# Patient Record
Sex: FEMALE | Race: WHITE
[De-identification: names, ages, dates, MRNs, and addresses within clinical notes are randomized per-mention and may not be internally consistent; named-entity substitution may affect disease eponyms.]

---

## 2018-02-06 ENCOUNTER — HOSPITAL ENCOUNTER (OUTPATIENT)
Dept: HOSPITAL 92 - DTY/OP | Age: 28
Discharge: HOME | End: 2018-02-06
Attending: SPECIALIST
Payer: COMMERCIAL

## 2018-02-06 DIAGNOSIS — Z01.818: Primary | ICD-10-CM

## 2018-02-06 DIAGNOSIS — E66.01: ICD-10-CM

## 2018-02-06 PROCEDURE — 97802 MEDICAL NUTRITION INDIV IN: CPT

## 2018-04-13 ENCOUNTER — HOSPITAL ENCOUNTER (INPATIENT)
Dept: HOSPITAL 92 - SURG A | Age: 28
LOS: 7 days | Discharge: HOME | DRG: 621 | End: 2018-04-20
Attending: SPECIALIST | Admitting: SPECIALIST
Payer: MEDICARE

## 2018-04-13 ENCOUNTER — HOSPITAL ENCOUNTER (OUTPATIENT)
Dept: HOSPITAL 92 - LABBT | Age: 28
Discharge: HOME | End: 2018-04-13
Attending: SPECIALIST
Payer: MEDICARE

## 2018-04-13 VITALS — BODY MASS INDEX: 38.5 KG/M2

## 2018-04-13 DIAGNOSIS — K21.9: ICD-10-CM

## 2018-04-13 DIAGNOSIS — Z87.820: ICD-10-CM

## 2018-04-13 DIAGNOSIS — E66.01: ICD-10-CM

## 2018-04-13 DIAGNOSIS — E66.01: Primary | ICD-10-CM

## 2018-04-13 DIAGNOSIS — Z79.84: ICD-10-CM

## 2018-04-13 DIAGNOSIS — Z01.818: Primary | ICD-10-CM

## 2018-04-13 DIAGNOSIS — E11.9: ICD-10-CM

## 2018-04-13 LAB
PREGS CONTROL BACKGROUND?: (no result)
PREGS CONTROL BAR APPEAR?: YES

## 2018-04-13 PROCEDURE — 80048 BASIC METABOLIC PNL TOTAL CA: CPT

## 2018-04-13 PROCEDURE — 85025 COMPLETE CBC W/AUTO DIFF WBC: CPT

## 2018-04-13 PROCEDURE — A4216 STERILE WATER/SALINE, 10 ML: HCPCS

## 2018-04-13 PROCEDURE — 84703 CHORIONIC GONADOTROPIN ASSAY: CPT

## 2018-04-13 PROCEDURE — C9113 INJ PANTOPRAZOLE SODIUM, VIA: HCPCS

## 2018-04-13 PROCEDURE — 36415 COLL VENOUS BLD VENIPUNCTURE: CPT

## 2018-04-19 PROCEDURE — 0D164ZL BYPASS STOMACH TO TRANSVERSE COLON, PERCUTANEOUS ENDOSCOPIC APPROACH: ICD-10-PCS | Performed by: SPECIALIST

## 2018-04-19 RX ADMIN — POTASSIUM CHLORIDE, DEXTROSE MONOHYDRATE AND SODIUM CHLORIDE SCH MLS: 150; 5; 450 INJECTION, SOLUTION INTRAVENOUS at 17:56

## 2018-04-19 RX ADMIN — POTASSIUM CHLORIDE, DEXTROSE MONOHYDRATE AND SODIUM CHLORIDE SCH: 150; 5; 450 INJECTION, SOLUTION INTRAVENOUS at 14:33

## 2018-04-20 VITALS — DIASTOLIC BLOOD PRESSURE: 66 MMHG | TEMPERATURE: 98.2 F | SYSTOLIC BLOOD PRESSURE: 99 MMHG

## 2018-04-20 LAB
ANION GAP SERPL CALC-SCNC: 7 MMOL/L (ref 10–20)
BASOPHILS # BLD AUTO: 0 THOU/UL (ref 0–0.2)
BASOPHILS NFR BLD AUTO: 0.3 % (ref 0–1)
BUN SERPL-MCNC: 5 MG/DL (ref 7–18.7)
CALCIUM SERPL-MCNC: 8.5 MG/DL (ref 7.8–10.44)
CHLORIDE SERPL-SCNC: 106 MMOL/L (ref 98–107)
CO2 SERPL-SCNC: 28 MMOL/L (ref 22–29)
CREAT CL PREDICTED SERPL C-G-VRATE: 186 ML/MIN (ref 70–130)
EOSINOPHIL # BLD AUTO: 0 THOU/UL (ref 0–0.7)
EOSINOPHIL NFR BLD AUTO: 0.2 % (ref 0–10)
GLUCOSE SERPL-MCNC: 129 MG/DL (ref 70–105)
HGB BLD-MCNC: 11.1 G/DL (ref 12–16)
LYMPHOCYTES # BLD: 1.5 THOU/UL (ref 1.2–3.4)
LYMPHOCYTES NFR BLD AUTO: 16 % (ref 21–51)
MCH RBC QN AUTO: 30.6 PG (ref 27–31)
MCV RBC AUTO: 91 FL (ref 81–99)
MONOCYTES # BLD AUTO: 0.7 THOU/UL (ref 0.11–0.59)
MONOCYTES NFR BLD AUTO: 7.5 % (ref 0–10)
NEUTROPHILS # BLD AUTO: 7.3 THOU/UL (ref 1.4–6.5)
NEUTROPHILS NFR BLD AUTO: 75.9 % (ref 42–75)
PLATELET # BLD AUTO: 218 THOU/UL (ref 130–400)
POTASSIUM SERPL-SCNC: 4.1 MMOL/L (ref 3.5–5.1)
RBC # BLD AUTO: 3.64 MILL/UL (ref 4.2–5.4)
SODIUM SERPL-SCNC: 137 MMOL/L (ref 136–145)
WBC # BLD AUTO: 9.6 THOU/UL (ref 4.8–10.8)

## 2018-04-20 RX ADMIN — POTASSIUM CHLORIDE, DEXTROSE MONOHYDRATE AND SODIUM CHLORIDE SCH MLS: 150; 5; 450 INJECTION, SOLUTION INTRAVENOUS at 03:00

## 2018-04-23 ENCOUNTER — HOSPITAL ENCOUNTER (INPATIENT)
Dept: HOSPITAL 92 - ERS | Age: 28
LOS: 1 days | Discharge: HOME | DRG: 389 | End: 2018-04-24
Attending: SPECIALIST | Admitting: SPECIALIST
Payer: MEDICARE

## 2018-04-23 VITALS — BODY MASS INDEX: 37.5 KG/M2

## 2018-04-23 DIAGNOSIS — Z87.820: ICD-10-CM

## 2018-04-23 DIAGNOSIS — Z98.84: ICD-10-CM

## 2018-04-23 DIAGNOSIS — K56.7: Primary | ICD-10-CM

## 2018-04-23 DIAGNOSIS — T40.605A: ICD-10-CM

## 2018-04-23 DIAGNOSIS — R50.9: ICD-10-CM

## 2018-04-23 DIAGNOSIS — Z88.2: ICD-10-CM

## 2018-04-23 DIAGNOSIS — J98.11: ICD-10-CM

## 2018-04-23 DIAGNOSIS — K21.9: ICD-10-CM

## 2018-04-23 DIAGNOSIS — E66.9: ICD-10-CM

## 2018-04-23 LAB
ALBUMIN SERPL BCG-MCNC: 4.1 G/DL (ref 3.5–5)
ALP SERPL-CCNC: 51 U/L (ref 40–150)
ALT SERPL W P-5'-P-CCNC: 82 U/L (ref 8–55)
ANION GAP SERPL CALC-SCNC: 15 MMOL/L (ref 10–20)
AST SERPL-CCNC: 23 U/L (ref 5–34)
BASOPHILS # BLD AUTO: 0 THOU/UL (ref 0–0.2)
BASOPHILS NFR BLD AUTO: 0.1 % (ref 0–1)
BILIRUB SERPL-MCNC: 0.6 MG/DL (ref 0.2–1.2)
BUN SERPL-MCNC: 10 MG/DL (ref 7–18.7)
CALCIUM SERPL-MCNC: 9.4 MG/DL (ref 7.8–10.44)
CHLORIDE SERPL-SCNC: 101 MMOL/L (ref 98–107)
CO2 SERPL-SCNC: 23 MMOL/L (ref 22–29)
CREAT CL PREDICTED SERPL C-G-VRATE: 0 ML/MIN (ref 70–130)
EOSINOPHIL # BLD AUTO: 0 THOU/UL (ref 0–0.7)
EOSINOPHIL NFR BLD AUTO: 0.2 % (ref 0–10)
GLOBULIN SER CALC-MCNC: 3.3 G/DL (ref 2.4–3.5)
GLUCOSE SERPL-MCNC: 114 MG/DL (ref 70–105)
HGB BLD-MCNC: 12.5 G/DL (ref 12–16)
LIPASE SERPL-CCNC: 66 U/L (ref 8–78)
LYMPHOCYTES # BLD: 0.7 THOU/UL (ref 1.2–3.4)
LYMPHOCYTES NFR BLD AUTO: 7.5 % (ref 21–51)
MCH RBC QN AUTO: 31.4 PG (ref 27–31)
MCV RBC AUTO: 88.6 FL (ref 81–99)
MONOCYTES # BLD AUTO: 0.3 THOU/UL (ref 0.11–0.59)
MONOCYTES NFR BLD AUTO: 3.8 % (ref 0–10)
NEUTROPHILS # BLD AUTO: 8 THOU/UL (ref 1.4–6.5)
NEUTROPHILS NFR BLD AUTO: 88.5 % (ref 42–75)
PLATELET # BLD AUTO: 299 THOU/UL (ref 130–400)
POTASSIUM SERPL-SCNC: 3.3 MMOL/L (ref 3.5–5.1)
PREGU CONTROL BACKGROUND?: (no result)
PREGU CONTROL BAR APPEAR?: YES
RBC # BLD AUTO: 3.97 MILL/UL (ref 4.2–5.4)
SODIUM SERPL-SCNC: 136 MMOL/L (ref 136–145)
SP GR UR STRIP: 1.02 (ref 1–1.04)
WBC # BLD AUTO: 9 THOU/UL (ref 4.8–10.8)

## 2018-04-23 PROCEDURE — 93005 ELECTROCARDIOGRAM TRACING: CPT

## 2018-04-23 PROCEDURE — 83690 ASSAY OF LIPASE: CPT

## 2018-04-23 PROCEDURE — 80048 BASIC METABOLIC PNL TOTAL CA: CPT

## 2018-04-23 PROCEDURE — 81025 URINE PREGNANCY TEST: CPT

## 2018-04-23 PROCEDURE — 96361 HYDRATE IV INFUSION ADD-ON: CPT

## 2018-04-23 PROCEDURE — 74177 CT ABD & PELVIS W/CONTRAST: CPT

## 2018-04-23 PROCEDURE — 80053 COMPREHEN METABOLIC PANEL: CPT

## 2018-04-23 PROCEDURE — 96367 TX/PROPH/DG ADDL SEQ IV INF: CPT

## 2018-04-23 PROCEDURE — 87040 BLOOD CULTURE FOR BACTERIA: CPT

## 2018-04-23 PROCEDURE — 71046 X-RAY EXAM CHEST 2 VIEWS: CPT

## 2018-04-23 PROCEDURE — 81003 URINALYSIS AUTO W/O SCOPE: CPT

## 2018-04-23 PROCEDURE — 87086 URINE CULTURE/COLONY COUNT: CPT

## 2018-04-23 PROCEDURE — 96365 THER/PROPH/DIAG IV INF INIT: CPT

## 2018-04-23 PROCEDURE — 85025 COMPLETE CBC W/AUTO DIFF WBC: CPT

## 2018-04-23 PROCEDURE — 36415 COLL VENOUS BLD VENIPUNCTURE: CPT

## 2018-04-23 PROCEDURE — 83605 ASSAY OF LACTIC ACID: CPT

## 2018-04-23 RX ADMIN — POTASSIUM CHLORIDE, DEXTROSE MONOHYDRATE AND SODIUM CHLORIDE SCH MLS: 150; 5; 450 INJECTION, SOLUTION INTRAVENOUS at 16:04

## 2018-04-23 RX ADMIN — SODIUM PHOSPHATE SCH ML: 7; 19 ENEMA RECTAL at 21:58

## 2018-04-23 RX ADMIN — SODIUM PHOSPHATE SCH ML: 7; 19 ENEMA RECTAL at 17:03

## 2018-04-23 NOTE — CT
CT ABDOMEN AND PELVIS WITH IV CONTRAST:

 

DATE: 4/23/18.

 

HISTORY: 

Postoperative fever secondary to bariatric surgery.

 

COMPARISON: 

None available.

 

FINDINGS: 

There are postsurgical changes related to gastric bypass procedure.  There is no extravasation of con
trast seen at the gastroenteric anastomosis.  No free intraperitoneal gas is visualized.  No fluid co
llection is seen to suggest an abscess.

 

There are patchy airspace opacities at each lung base which may be related to atelectasis, although t
here is a question of additional reticulonodular densities at the right lung base, and findings could
 be related to pneumonitis at each lung base.

 

There is a tiny left pleural effusion present.

 

While precontrast images were not obtained, there does appear to be mild diminished attenuation of th
e liver relative to the spleen which is suggestive of fatty infiltration.  The liver is otherwise nor
mal in appearance.

 

The spleen, pancreas, bilateral adrenal glands, kidneys, urinary bladder, uterus, and adnexal structu
res have a normal CT appearance.

 

There is a small amount of free fluid in the pelvis.  

 

The opacified proximal small bowel is normal in appearance.  The remainder of the small bowel is also
 normal in caliber.

 

The appendix is visualized and normal in caliber.

 

Abdominal aorta is normal in appearance.

 

Osseous structures are intact.

 

IMPRESSION: 

1.  Tiny left pleural effusion with bibasilar ill-defined airspace opacities which may be attributabl
e to atelectasis, but pneumonitis is a possibility.

 

2.  Postsurgical changes related to gastric bypass procedure.  There is no extravasation of contrast,
 and no free intraperitoneal gas or fluid collection is seen adjacent to the region of postsurgical c
hange.

 

3.  No evidence of a bowel obstruction.

 

4.  Small amount of free fluid in the pelvis including the right paracolic gutter.

 

5.  Mild fatty infiltration of the liver.

 

6.  No CT evidence of appendicitis.

 

 

 

POS: [facility]

## 2018-04-23 NOTE — RAD
PA AND LATERAL VIEWS CHEST:

 

Date:  04/23/18 

 

HISTORY:  

Cough and fever. Patient had bypass on 04/19/18. 

 

FINDINGS:

The heart size is normal. There is a mild infiltrate in the left inferior lower lobe. No pneumothorac
es are seen. A tiny left pleural effusion may be present. 

 

IMPRESSION: 

Possibility of left lower lobe pneumonia should be considered. 

 

 

POS: [facility]

## 2018-04-23 NOTE — HP
CHIEF COMPLAINT:  Fever, tachycardia.

 

HISTORY OF PRESENT ILLNESS:  The patient is a 28-year-old white female.  She is status post uneventfu
l laparoscopic gastric bypass on 2018.  She is now postoperative day #4.  She was discharged ho
me the day after surgery, tolerating liquids and entirely stable.  She notes that she developed a fev
er this morning and called my office.  Her temperature at home when measured was 103.6.  Her temperat
ure when checked here in the emergency room was 102.2.  She was tachycardic with heart rate in the 14
0s.  Labs and cultures were drawn and she was then given IV fluids and IV antibiotics using Zosyn.

 

She notes that she has been tolerating her liquids and has not vomited.  She has been urinating frequ
ently, but has passed no flatus or bowel movement.  She does note that she has been using a large ramila
unt of her narcotic medication.  She has significant perceived pain in her upper abdomen.  She notes 
that she is able to breathe and cough, but she has pain with coughing.  She has no sensation of short
ness of breath.

 

PAST MEDICAL HISTORY:  Significant for traumatic brain injury, gastroesophageal reflux disease, and o
besity.

 

PAST SURGICAL HISTORY:  , tracheostomy, PEG tube placement, laparoscopic gastric bypass on 0
2018.

 

CURRENT MEDICATIONS:  When she was discharged include pantoprazole, albuterol p.r.n., bariatric vitam
ins, hydrocodone elixir.

 

ALLERGIES:  BACTRIM.

 

PERSONAL AND SOCIAL HISTORY:  She is disabled secondary to history of traumatic brain injury.  She do
es not smoke nor does she drink alcohol.

 

PHYSICAL EXAMINATION:

GENERAL APPEARANCE:  On examination, she is alert and pleasant.  She does not appear to be in severe 
distress.  She notes that she feels bloated and has generalized discomfort within her abdomen, but no
 acute pain.  She is conversant and cooperative.

VITAL SIGNS:  She is currently afebrile with temperature of 100, pulse is down to 120 and it is regul
ar, blood pressure is within normal limits at 120/65.

HEENT:  Unremarkable.

NECK:  Supple.

LUNGS:  Clear to auscultation anteriorly.

CARDIAC:  Regular rate and rhythm without murmur.

ABDOMEN:  Incisions are nicely healed without evidence of infection.  She has essentially no audible 
bowel sounds in her abdomen.  Her abdomen does appear to be somewhat distended.

RECTAL:  Exam is deferred.

 

LABORATORY DATA AND IMAGING DATA:  Her hemoglobin is 12.5.  This is up from 11.1 day after her surger
y.  Her white blood cell count is 9.  She does appear to have a left shift with 88% neutrophils.  Vidya
mamta profile reveals essentially normal electrolytes.  Her BUN and creatinine are normal.  Her pota
ssium is slightly low at 3.3.  Her albumin is normal at 4.1.  Liver function tests are normal.  Chest
 x-ray and CT scan of abdomen and pelvis were reviewed.  She has possibly some atelectasis at the bas
e of her left lung, both seen on chest x-ray and CT of the abdomen and pelvis.  There are no extralum
inal contrasts nor any significant volume of free fluid, nor any free air in her abdomen.  She does h
ave some distention and most of that appears to be in her colon.  Her small bowel appears to be fluid
 filled, but not distended.  There is no distention of the bypassed portion of the stomach.

 

ASSESSMENT:  Patient is febrile and tachycardic.  I suspect this is because of her pulmonary findings
 even though they are not very impressive on CT scan.  I suspect that she had some atelectasis and po
ssibly some early pneumonia.  I suspect that she has narcotic-induced ileus that which led to abdomin
al distention and that has led to decreased pulmonary function.  [facility].  She will be encouraged to ambulate.  Perform incentive spirometry
 and cough.  I will start her empirically on MiraLax and enemas and to try to minimize her narcotics.
  She will be given Ofirmev and Toradol for pain control.  I would anticipate hospital stay of a coup
le of days to ensure that she may safely be discharged.

## 2018-04-24 VITALS — TEMPERATURE: 97.9 F | SYSTOLIC BLOOD PRESSURE: 98 MMHG | DIASTOLIC BLOOD PRESSURE: 62 MMHG

## 2018-04-24 LAB
ANION GAP SERPL CALC-SCNC: 10 MMOL/L (ref 10–20)
BASOPHILS # BLD AUTO: 0 THOU/UL (ref 0–0.2)
BASOPHILS NFR BLD AUTO: 0.3 % (ref 0–1)
BUN SERPL-MCNC: 7 MG/DL (ref 7–18.7)
CALCIUM SERPL-MCNC: 8.4 MG/DL (ref 7.8–10.44)
CHLORIDE SERPL-SCNC: 107 MMOL/L (ref 98–107)
CO2 SERPL-SCNC: 25 MMOL/L (ref 22–29)
CREAT CL PREDICTED SERPL C-G-VRATE: 201 ML/MIN (ref 70–130)
EOSINOPHIL # BLD AUTO: 0.1 THOU/UL (ref 0–0.7)
EOSINOPHIL NFR BLD AUTO: 2.1 % (ref 0–10)
GLUCOSE SERPL-MCNC: 100 MG/DL (ref 70–105)
HGB BLD-MCNC: 10.2 G/DL (ref 12–16)
LYMPHOCYTES # BLD: 1.4 THOU/UL (ref 1.2–3.4)
LYMPHOCYTES NFR BLD AUTO: 20.7 % (ref 21–51)
MCH RBC QN AUTO: 30.3 PG (ref 27–31)
MCV RBC AUTO: 89.8 FL (ref 81–99)
MONOCYTES # BLD AUTO: 0.4 THOU/UL (ref 0.11–0.59)
MONOCYTES NFR BLD AUTO: 6.6 % (ref 0–10)
NEUTROPHILS # BLD AUTO: 4.7 THOU/UL (ref 1.4–6.5)
NEUTROPHILS NFR BLD AUTO: 70.3 % (ref 42–75)
PLATELET # BLD AUTO: 245 THOU/UL (ref 130–400)
POTASSIUM SERPL-SCNC: 3.3 MMOL/L (ref 3.5–5.1)
RBC # BLD AUTO: 3.38 MILL/UL (ref 4.2–5.4)
SODIUM SERPL-SCNC: 139 MMOL/L (ref 136–145)
WBC # BLD AUTO: 6.7 THOU/UL (ref 4.8–10.8)

## 2018-04-24 RX ADMIN — POTASSIUM CHLORIDE, DEXTROSE MONOHYDRATE AND SODIUM CHLORIDE SCH MLS: 150; 5; 450 INJECTION, SOLUTION INTRAVENOUS at 05:06

## 2018-04-24 NOTE — DPRG
DATE OF SERVICE:  04/24/2018

 

HISTORY:  This is hospital day #2.  Ms. Abdalla was admitted yesterday with a high fever and tachyca
rdia.  CT scan showed no evidence of intra-abdominal problem or leak.  I suspected a pulmonary etiolo
gy.  She was admitted to the hospital and narcotics were avoided.  She was given an enema and MiraLax
 and she had resumption of bowel function.  She has had no nausea or vomiting since admission.  She h
as been afebrile since admission.

 

She notes today that she has zero pain.  She has been ambulating and tolerating her clear liquid diet
 uneventfully.

 

PHYSICAL  EXAMINATION:  

LUNGS:  Her lungs are clear to auscultation.  She has a good cough.

ABDOMEN:  Soft and nontender.  Incisions are nicely healed.  She has good bowel sounds.

 

LABORATORY STUDIES:  Her metabolic panel and CBC are essentially normal with normal white count, no e
vidence of left shift.

 

ASSESSMENT AND PLAN:  The patient with resolved fever and tachycardia.  I suspect that this was relat
ed to atelectasis.  It was associated with bowel distention secondary to some degree of ileus related
 to narcotic use.  All these problems seemed to have resolved and she feels well today.  I believe padmini marrero is stable for discharge home on her liquid diet.  She is instructed to avoid narcotics.  I will see
 her next week for her usual followup visit 2 weeks out from surgery.

## 2019-02-16 ENCOUNTER — HOSPITAL ENCOUNTER (EMERGENCY)
Dept: HOSPITAL 92 - ERS | Age: 29
Discharge: HOME | End: 2019-02-16
Payer: MEDICARE

## 2019-02-16 DIAGNOSIS — N30.00: Primary | ICD-10-CM

## 2019-02-16 LAB
ALBUMIN SERPL BCG-MCNC: 4.4 G/DL (ref 3.5–5)
ALP SERPL-CCNC: 53 U/L (ref 40–150)
ALT SERPL W P-5'-P-CCNC: 33 U/L (ref 8–55)
ANION GAP SERPL CALC-SCNC: 12 MMOL/L (ref 10–20)
AST SERPL-CCNC: 24 U/L (ref 5–34)
BASOPHILS # BLD AUTO: 0.1 THOU/UL (ref 0–0.2)
BASOPHILS NFR BLD AUTO: 1.5 % (ref 0–1)
BILIRUB SERPL-MCNC: 0.3 MG/DL (ref 0.2–1.2)
BUN SERPL-MCNC: 11 MG/DL (ref 7–18.7)
CALCIUM SERPL-MCNC: 9.6 MG/DL (ref 7.8–10.44)
CHLORIDE SERPL-SCNC: 103 MMOL/L (ref 98–107)
CO2 SERPL-SCNC: 28 MMOL/L (ref 22–29)
CREAT CL PREDICTED SERPL C-G-VRATE: 0 ML/MIN (ref 70–130)
CRYSTAL-AUWI FLAG: 1.1 (ref 0–15)
EOSINOPHIL # BLD AUTO: 0.1 THOU/UL (ref 0–0.7)
EOSINOPHIL NFR BLD AUTO: 1.5 % (ref 0–10)
GLOBULIN SER CALC-MCNC: 3 G/DL (ref 2.4–3.5)
GLUCOSE SERPL-MCNC: 118 MG/DL (ref 70–105)
HEV IGM SER QL: 0.5 (ref 0–7.99)
HGB BLD-MCNC: 12.9 G/DL (ref 12–16)
HYALINE CASTS #/AREA URNS LPF: (no result) LPF
LYMPHOCYTES # BLD: 2.9 THOU/UL (ref 1.2–3.4)
LYMPHOCYTES NFR BLD AUTO: 38.4 % (ref 21–51)
MCH RBC QN AUTO: 31.5 PG (ref 27–31)
MCV RBC AUTO: 93.3 FL (ref 78–98)
MONOCYTES # BLD AUTO: 0.3 THOU/UL (ref 0.11–0.59)
MONOCYTES NFR BLD AUTO: 4.5 % (ref 0–10)
NEUTROPHILS # BLD AUTO: 4.1 THOU/UL (ref 1.4–6.5)
NEUTROPHILS NFR BLD AUTO: 54.1 % (ref 42–75)
PATHC CAST-AUWI FLAG: 0 (ref 0–2.49)
PLATELET # BLD AUTO: 257 THOU/UL (ref 130–400)
POTASSIUM SERPL-SCNC: 3.8 MMOL/L (ref 3.5–5.1)
PREGU CONTROL BACKGROUND?: (no result)
PREGU CONTROL BAR APPEAR?: YES
RBC # BLD AUTO: 4.09 MILL/UL (ref 4.2–5.4)
SODIUM SERPL-SCNC: 139 MMOL/L (ref 136–145)
SP GR UR STRIP: 1.02 (ref 1–1.04)
SPERM-AUWI FLAG: 0 (ref 0–9.9)
WBC # BLD AUTO: 7.5 THOU/UL (ref 4.8–10.8)
YEAST-AUWI FLAG: 0 (ref 0–25)

## 2019-02-16 PROCEDURE — 85025 COMPLETE CBC W/AUTO DIFF WBC: CPT

## 2019-02-16 PROCEDURE — 83690 ASSAY OF LIPASE: CPT

## 2019-02-16 PROCEDURE — 81015 MICROSCOPIC EXAM OF URINE: CPT

## 2019-02-16 PROCEDURE — 99284 EMERGENCY DEPT VISIT MOD MDM: CPT

## 2019-02-16 PROCEDURE — 80053 COMPREHEN METABOLIC PANEL: CPT

## 2019-02-16 PROCEDURE — 81025 URINE PREGNANCY TEST: CPT

## 2019-02-16 PROCEDURE — 36415 COLL VENOUS BLD VENIPUNCTURE: CPT

## 2019-02-16 PROCEDURE — 81003 URINALYSIS AUTO W/O SCOPE: CPT

## 2019-06-07 ENCOUNTER — HOSPITAL ENCOUNTER (EMERGENCY)
Dept: HOSPITAL 92 - ERS | Age: 29
Discharge: HOME | End: 2019-06-07
Payer: MEDICARE

## 2019-06-07 DIAGNOSIS — L29.9: ICD-10-CM

## 2019-06-07 DIAGNOSIS — O99.89: Primary | ICD-10-CM

## 2019-06-07 DIAGNOSIS — Z3A.19: ICD-10-CM

## 2019-06-07 LAB
CRYSTAL-AUWI FLAG: 0 (ref 0–15)
HCG UR QL: POSITIVE
HEV IGM SER QL: 0.6 (ref 0–7.99)
HYALINE CASTS #/AREA URNS LPF: (no result) LPF
PATHC CAST-AUWI FLAG: 0.13 (ref 0–2.49)
PREGU CONTROL BACKGROUND?: (no result)
PREGU CONTROL BAR APPEAR?: YES
RBC UR QL AUTO: (no result) HPF (ref 0–3)
SP GR UR STRIP: 1.01 (ref 1–1.04)
SPERM-AUWI FLAG: 0 (ref 0–9.9)
WBC UR QL AUTO: (no result) HPF (ref 0–3)
YEAST-AUWI FLAG: 0 (ref 0–25)

## 2019-06-07 PROCEDURE — 87510 GARDNER VAG DNA DIR PROBE: CPT

## 2019-06-07 PROCEDURE — 87480 CANDIDA DNA DIR PROBE: CPT

## 2019-06-07 PROCEDURE — 87591 N.GONORRHOEAE DNA AMP PROB: CPT

## 2019-06-07 PROCEDURE — 87491 CHLMYD TRACH DNA AMP PROBE: CPT

## 2019-06-07 PROCEDURE — 87660 TRICHOMONAS VAGIN DIR PROBE: CPT

## 2019-06-07 PROCEDURE — 81025 URINE PREGNANCY TEST: CPT

## 2019-06-07 PROCEDURE — 81015 MICROSCOPIC EXAM OF URINE: CPT

## 2019-06-07 PROCEDURE — 81003 URINALYSIS AUTO W/O SCOPE: CPT

## 2019-06-25 ENCOUNTER — HOSPITAL ENCOUNTER (OUTPATIENT)
Dept: HOSPITAL 92 - L&D/OP | Age: 29
Discharge: HOME | End: 2019-06-25
Attending: OBSTETRICS & GYNECOLOGY
Payer: MEDICARE

## 2019-06-25 VITALS — BODY MASS INDEX: 26.3 KG/M2

## 2019-06-25 DIAGNOSIS — Z98.84: ICD-10-CM

## 2019-06-25 DIAGNOSIS — Z88.1: ICD-10-CM

## 2019-06-25 DIAGNOSIS — Z3A.21: ICD-10-CM

## 2019-06-25 DIAGNOSIS — O36.8120: Primary | ICD-10-CM

## 2019-06-25 DIAGNOSIS — Z88.8: ICD-10-CM

## 2019-06-25 DIAGNOSIS — Z88.2: ICD-10-CM

## 2019-06-25 PROCEDURE — 99282 EMERGENCY DEPT VISIT SF MDM: CPT
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
## 2019-10-20 ENCOUNTER — HOSPITAL ENCOUNTER (OUTPATIENT)
Dept: HOSPITAL 92 - L&D/OP | Age: 29
Discharge: HOME | End: 2019-10-20
Attending: OBSTETRICS & GYNECOLOGY
Payer: MEDICARE

## 2019-10-20 VITALS — SYSTOLIC BLOOD PRESSURE: 108 MMHG | TEMPERATURE: 98.1 F | DIASTOLIC BLOOD PRESSURE: 62 MMHG

## 2019-10-20 VITALS — BODY MASS INDEX: 29 KG/M2

## 2019-10-20 DIAGNOSIS — Z88.2: ICD-10-CM

## 2019-10-20 DIAGNOSIS — Z88.8: ICD-10-CM

## 2019-10-20 DIAGNOSIS — O36.8130: Primary | ICD-10-CM

## 2019-10-20 DIAGNOSIS — J45.909: ICD-10-CM

## 2019-10-20 DIAGNOSIS — Z3A.38: ICD-10-CM

## 2019-10-20 DIAGNOSIS — O99.513: ICD-10-CM
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
## 2019-10-28 ENCOUNTER — HOSPITAL ENCOUNTER (INPATIENT)
Dept: HOSPITAL 92 - L&D | Age: 29
LOS: 2 days | Discharge: HOME | End: 2019-10-30
Attending: OBSTETRICS & GYNECOLOGY | Admitting: OBSTETRICS & GYNECOLOGY
Payer: MEDICARE

## 2019-10-28 VITALS — BODY MASS INDEX: 29.2 KG/M2

## 2019-10-28 DIAGNOSIS — F32.9: ICD-10-CM

## 2019-10-28 DIAGNOSIS — F41.9: ICD-10-CM

## 2019-10-28 DIAGNOSIS — Z3A.39: ICD-10-CM

## 2019-10-28 LAB
HBSAG INDEX: 0.14 S/CO (ref 0–0.99)
HGB BLD-MCNC: 10.6 G/DL (ref 12–16)
MCH RBC QN AUTO: 29.8 PG (ref 27–31)
MCV RBC AUTO: 89.9 FL (ref 78–98)
PLATELET # BLD AUTO: 270 THOU/UL (ref 130–400)
RBC # BLD AUTO: 3.56 MILL/UL (ref 4.2–5.4)
SYPHILIS ANTIBODY INDEX: 0.05 S/CO
WBC # BLD AUTO: 7.5 THOU/UL (ref 4.8–10.8)

## 2019-10-28 PROCEDURE — 85027 COMPLETE CBC AUTOMATED: CPT

## 2019-10-28 PROCEDURE — 86850 RBC ANTIBODY SCREEN: CPT

## 2019-10-28 PROCEDURE — 86900 BLOOD TYPING SEROLOGIC ABO: CPT

## 2019-10-28 PROCEDURE — 86901 BLOOD TYPING SEROLOGIC RH(D): CPT

## 2019-10-28 PROCEDURE — 36415 COLL VENOUS BLD VENIPUNCTURE: CPT

## 2019-10-28 PROCEDURE — 51702 INSERT TEMP BLADDER CATH: CPT

## 2019-10-28 PROCEDURE — 0HQ9XZZ REPAIR PERINEUM SKIN, EXTERNAL APPROACH: ICD-10-PCS | Performed by: OBSTETRICS & GYNECOLOGY

## 2019-10-28 PROCEDURE — 86780 TREPONEMA PALLIDUM: CPT

## 2019-10-28 PROCEDURE — 87340 HEPATITIS B SURFACE AG IA: CPT

## 2019-10-28 PROCEDURE — 10907ZC DRAINAGE OF AMNIOTIC FLUID, THERAPEUTIC FROM PRODUCTS OF CONCEPTION, VIA NATURAL OR ARTIFICIAL OPENING: ICD-10-PCS | Performed by: OBSTETRICS & GYNECOLOGY

## 2019-10-28 RX ADMIN — Medication SCH MLS: at 22:21

## 2019-10-29 RX ADMIN — HYDROCODONE BITARTRATE AND ACETAMINOPHEN PRN TAB: 5; 325 TABLET ORAL at 11:31

## 2019-10-29 RX ADMIN — HYDROCODONE BITARTRATE AND ACETAMINOPHEN PRN TAB: 5; 325 TABLET ORAL at 22:46

## 2019-10-29 RX ADMIN — DOCUSATE CALCIUM SCH MG: 240 CAPSULE, LIQUID FILLED ORAL at 09:13

## 2019-10-29 RX ADMIN — HYDROCODONE BITARTRATE AND ACETAMINOPHEN PRN TAB: 5; 325 TABLET ORAL at 13:21

## 2019-10-29 RX ADMIN — DOCUSATE CALCIUM SCH MG: 240 CAPSULE, LIQUID FILLED ORAL at 21:35

## 2019-10-29 RX ADMIN — Medication SCH MLS: at 02:05

## 2019-10-29 RX ADMIN — HYDROCODONE BITARTRATE AND ACETAMINOPHEN PRN TAB: 5; 325 TABLET ORAL at 03:12

## 2019-10-30 VITALS — DIASTOLIC BLOOD PRESSURE: 68 MMHG | SYSTOLIC BLOOD PRESSURE: 115 MMHG | TEMPERATURE: 98.8 F

## 2019-10-30 RX ADMIN — DOCUSATE CALCIUM SCH MG: 240 CAPSULE, LIQUID FILLED ORAL at 09:41

## 2019-10-30 RX ADMIN — HYDROCODONE BITARTRATE AND ACETAMINOPHEN PRN TAB: 5; 325 TABLET ORAL at 13:19

## 2019-10-30 NOTE — DN
DATE OF PROCEDURE:  10/28/2019



PREOPERATIVE DIAGNOSES:  

1. A 29-year-old G3, P2, with a history of a previous  section, in active

labor. 

2. Group B Streptococcus negative.

3. Artificial rupture of membranes for clear fluid.

4. History of gastric bypass with subsequent anemia of pregnancy due to

malabsorption. 

5. Depression and anxiety.



POSTOPERATIVE DIAGNOSES:  

1. A 29-year-old G3, P2, with a history of a previous  section, in active

labor. 

2. Group B Streptococcus negative.

3. Artificial rupture of membranes for clear fluid.

4. History of gastric bypass with subsequent anemia of pregnancy due to

malabsorption. 

5. Depression and anxiety.

6. Live-born male infant with Apgars of 9 and 9 at 1 and 5 minutes respectively.

7. Nuchal cord in delivery reduced.

8. Non-reassuring fetal heart tracing and asynclitic presentation at the perineum.



PROCEDURE PERFORMED:  Vacuum-assisted vaginal delivery.



QUANTITATIVE BLOOD LOSS:  100 mL.



CLINICAL HISTORY:  This patient is a 29-year-old  female, who presented to

her routine OB appointment and was noted to be in active labor.  She was 4 cm, and

80% effaced and feeling pressure.  She was sent to Labor and Delivery and placed on

the monitors and had a category 1 tracing.  An amniotomy was performed for clear

fluid.  Shortly thereafter, she did request an epidural for maternal analgesia and

was able to get adequate pain control.  She progressed appropriately on the labor

curve, and was complete and +1 station.  By the early afternoon, the patient was

able to push with good maternal effort; however the trajectory of the fetus

delivering in the birth canal was toward the pubic bone rather than down to the base

of the perineum.  The patient employed several methods to push and was successful in

getting the fetus under the pubic bone; however, the baby was starting to notice

some swelling and some caput on the head and the tracing was reflecting compression

of the cord as well as the infant not tolerating this trajectory.  The risks,

benefits, and possible complications as well as alternatives to vacuum-assisted

delivery were discussed and the patient wished to proceed. 



DESCRIPTION OF PROCEDURE:  The suction cup was placed over the presenting fetal

vertex after draining the bladder with a red rubber catheter.  Only 25 mL of clear

urine were obtained.  After the cup was placed on the head of the fetus, the borders

were swept to make sure that there was no other tissue maternal wise in the cup.

The suction was turned to the green zone and with the next contraction, the mother

was advised to push and the fetal vertex was pulled in a downward manner.  This

allowed for further delivery of the head.  With the second pull, the head delivered,

followed by the anterior shoulder, then the posterior shoulder, then the remainder

of the infant's body.  There was a nuchal cord that was noted and it was

successfully reduced at the perineum prior to delivery of the remainder of the body.

 The infant was stimulated and cried spontaneously.  The cord was doubly clamped and

cut.  There was some suction of the mouth and nose at the perineum.  The infant was

then placed onto the abdomen and then with continued stimulation and care there.

The patient was explored and noted to have only a left labial laceration, which was

not bleeding; however, was for cosmetic reasons, repaired, it was considered a first

degree.  The placenta was delivered spontaneously intact with a 3-vessel cord and

fundus massaged, noted as a firm uterus.  Exploration of the vagina, introitus, and

the cervix revealed nothing more afterwards.  The patient was allowed to recover on

Labor and Delivery in satisfactory condition with her infant.  Again, the infant was

a live born male, weighing 7 pounds 6 ounces with Apgars of 9 and 9 at 1 and 5

minutes respectively.  There were no other issues surrounding this delivery.  All

needle, sponge, lap, and instrument counts were correct x2 at the end of the

procedure. 







Job ID:  [id number]

## 2020-06-12 ENCOUNTER — HOSPITAL ENCOUNTER (OUTPATIENT)
Dept: HOSPITAL 92 - SCSULT | Age: 30
Discharge: HOME | End: 2020-06-12
Attending: FAMILY MEDICINE
Payer: MEDICARE

## 2020-06-12 DIAGNOSIS — E04.1: Primary | ICD-10-CM

## 2020-06-12 PROCEDURE — 76536 US EXAM OF HEAD AND NECK: CPT

## 2020-06-12 NOTE — ULT
THYROID ULTRASOUND:

 

Comparison: 10-27-17

 

Technique: Multiplanar grayscale and color doppler images were obtained in a thyroid ultrasound. 

 

FINDINGS: 

There is a solid nodule again seen in the right lobe of the thyroid. This is the nodule for which chiki
or biopsy was performed. This nodule was circumscribed and isoechoic to the back on thyroid parenchym
a. This measures 3.3 cm in greatest dimension. This is not connected to suspicious calcifications and
 is wider than it is tall. Cysts are seen in the left thyroid lobe. The thyroid lobes measure 5.5 and
 4.6 cm in length on the right and left respectively. 

 

IMPRESSION: 

Solid right thyroid nodule. This lesion is a TIRADS category III lesion. An FNA is recommended, but a
n FNA was already performed of this nodule. This nodule appears to have enlarged compared to the prio
r examination. If additional tissue sampling is desired, then a core biopsy of this nodule is recomme
nded. 

 

POS: [facility]

## 2020-07-06 ENCOUNTER — HOSPITAL ENCOUNTER (OUTPATIENT)
Dept: HOSPITAL 92 - LABSCS | Age: 30
Discharge: HOME | End: 2020-07-06
Attending: OTOLARYNGOLOGY
Payer: MEDICARE

## 2020-07-06 DIAGNOSIS — Z11.59: Primary | ICD-10-CM

## 2020-07-06 PROCEDURE — 84439 ASSAY OF FREE THYROXINE: CPT

## 2020-07-06 PROCEDURE — 86376 MICROSOMAL ANTIBODY EACH: CPT

## 2020-07-06 PROCEDURE — 87635 SARS-COV-2 COVID-19 AMP PRB: CPT

## 2020-07-06 PROCEDURE — 86800 THYROGLOBULIN ANTIBODY: CPT

## 2020-07-06 PROCEDURE — 84481 FREE ASSAY (FT-3): CPT

## 2020-07-06 PROCEDURE — 84443 ASSAY THYROID STIM HORMONE: CPT

## 2020-07-06 PROCEDURE — U0003 INFECTIOUS AGENT DETECTION BY NUCLEIC ACID (DNA OR RNA); SEVERE ACUTE RESPIRATORY SYNDROME CORONAVIRUS 2 (SARS-COV-2) (CORONAVIRUS DISEASE [COVID-19]), AMPLIFIED PROBE TECHNIQUE, MAKING USE OF HIGH THROUGHPUT TECHNOLOGIES AS DESCRIBED BY CMS-2020-01-R: HCPCS

## 2020-07-06 PROCEDURE — 36415 COLL VENOUS BLD VENIPUNCTURE: CPT

## 2021-04-23 ENCOUNTER — HOSPITAL ENCOUNTER (EMERGENCY)
Dept: HOSPITAL 92 - ERS | Age: 31
Discharge: HOME | End: 2021-04-23
Payer: MEDICARE

## 2021-04-23 DIAGNOSIS — R11.2: ICD-10-CM

## 2021-04-23 DIAGNOSIS — R10.11: Primary | ICD-10-CM

## 2021-04-23 LAB
ALBUMIN SERPL BCG-MCNC: 4.3 G/DL (ref 3.5–5)
ALP SERPL-CCNC: 77 U/L (ref 40–110)
ALT SERPL W P-5'-P-CCNC: 26 U/L (ref 8–55)
ANION GAP SERPL CALC-SCNC: 12 MMOL/L (ref 10–20)
AST SERPL-CCNC: 29 U/L (ref 5–34)
BASOPHILS # BLD AUTO: 0.1 THOU/UL (ref 0–0.2)
BASOPHILS NFR BLD AUTO: 0.7 % (ref 0–1)
BILIRUB SERPL-MCNC: (no result) MG/DL (ref 0.2–1.2)
BUN SERPL-MCNC: 14 MG/DL (ref 7–18.7)
CALCIUM SERPL-MCNC: 9.1 MG/DL (ref 7.8–10.44)
CHLORIDE SERPL-SCNC: 103 MMOL/L (ref 98–107)
CO2 SERPL-SCNC: 28 MMOL/L (ref 22–29)
CREAT CL PREDICTED SERPL C-G-VRATE: 0 ML/MIN (ref 70–130)
EOSINOPHIL # BLD AUTO: 0.1 THOU/UL (ref 0–0.7)
EOSINOPHIL NFR BLD AUTO: 0.8 % (ref 0–10)
GLOBULIN SER CALC-MCNC: 3.7 G/DL (ref 2.4–3.5)
GLUCOSE SERPL-MCNC: 127 MG/DL (ref 70–105)
HGB BLD-MCNC: 11.4 G/DL (ref 12–16)
LIPASE SERPL-CCNC: 39 U/L (ref 8–78)
LYMPHOCYTES # BLD: 1.4 THOU/UL (ref 1.2–3.4)
LYMPHOCYTES NFR BLD AUTO: 15.4 % (ref 21–51)
MCH RBC QN AUTO: 28.4 PG (ref 27–31)
MCV RBC AUTO: 84.8 FL (ref 78–98)
MONOCYTES # BLD AUTO: 0.4 THOU/UL (ref 0.11–0.59)
MONOCYTES NFR BLD AUTO: 4 % (ref 0–10)
NEUTROPHILS # BLD AUTO: 7.2 THOU/UL (ref 1.4–6.5)
NEUTROPHILS NFR BLD AUTO: 79.1 % (ref 42–75)
PLATELET # BLD AUTO: 286 THOU/UL (ref 130–400)
POTASSIUM SERPL-SCNC: 4 MMOL/L (ref 3.5–5.1)
RBC # BLD AUTO: 4 MILL/UL (ref 4.2–5.4)
SODIUM SERPL-SCNC: 139 MMOL/L (ref 136–145)
WBC # BLD AUTO: 9.2 THOU/UL (ref 4.8–10.8)

## 2021-04-23 PROCEDURE — 83690 ASSAY OF LIPASE: CPT

## 2021-04-23 PROCEDURE — 96375 TX/PRO/DX INJ NEW DRUG ADDON: CPT

## 2021-04-23 PROCEDURE — 80053 COMPREHEN METABOLIC PANEL: CPT

## 2021-04-23 PROCEDURE — 96374 THER/PROPH/DIAG INJ IV PUSH: CPT

## 2021-04-23 PROCEDURE — 85025 COMPLETE CBC W/AUTO DIFF WBC: CPT

## 2023-05-08 NOTE — OP
DATE OF PROCEDURE:  04/19/2018

 

PREOPERATIVE DIAGNOSIS:  Morbid obesity.

 

POSTOPERATIVE DIAGNOSIS:  Morbid obesity.

 

PROCEDURE PERFORMED:  Laparoscopic gastric bypass.

 

SURGEON:  Hugo Regalado M.D.

 

ANESTHESIA:  General endotracheal.

 

INDICATIONS:  The patient is a 28-year-old white female.  She has a history of a prior traumatic brai
n injury at which time she had a PEG tube and a tracheostomy tube placed.  She has recuperated from t
his injury and has unfortunately developed morbid obesity.  She has undergone preoperative evaluation
 and education and is taken to the operating room at this time for laparoscopic gastric bypass.  The 
main reason for her selecting this procedure is her significant gastroesophageal reflux disease.

 

DESCRIPTION OF OPERATION:  Informed consent was obtained.  The patient was taken to the operating rocael
m where general endotracheal anesthesia was obtained with the patient in supine position.  Abdomen wa
s prepped with ChloraPrep and draped in sterile fashion.  Local anesthetic was infiltrated and a 5-mm
 supraumbilical incision was created through which a Veress needle was passed into the peritoneal cav
ity and pneumoperitoneum was established using carbon dioxide up to a pressure of 15 mmHg.  A 5 mm tr
ocar port site was passed through the same incision.  Laparoscopic camera was passed this port.  Unde
r direct vision, 4 additional ports were placed including bilateral subcostal 5 mm ports, a 12 mm rig
ht paramedian port and a 15 mm left paramedian port.

 

Attention was turned to the upper abdomen.  The gastric adhesions to the abdominal wall at the site o
f her PEG tube were the only abnormality in the upper abdomen.  I dissected the stomach away from the
 anterior abdominal wall using the LigaSure device.  I never saw any gastric spillage, but the area w
here the PEG tube then was certainly damaged during the course of the dissection and I resected this 
area using a single fire of the blue load of the Cale stapler.

 

The omentum was split in the midline up to the level of the transverse colon.  The ligament of Treitz
 was identified and 50 cm distal ligament of Treitz, the small bowel was divided with a single fire o
f the white load of the Cale stapler.  The small bowel was devascularized for 5 cm on the distal s
egment that had been divided.  This was done with the LigaSure device.  The small bowel was then trac
ed 100 cm distally and an anastomosis was created between the proximal stapled end and a Mariela limb 10
0 cm distally.  The anastomosis was created with a single fire of the white load of the Cale stapl
er.  The enterotomy was closed with a single fire of the white load of the stapler as well.  The clos
ure of the enterotomy lead to more of a longitudinal orientation of the staple line onto the more pro
ximal segment of the Mariela limb that is typical.  This, however, did not appear to produce adequate sm
all bowel stenosis that I felt it would be an obstruction.

 

The mesenteric defect was closed with two interrupted sutures of 3-0 Vicryl placed in a [id number]
[id number] fashion.

 

Attention was then turned to the upper abdomen.  The patient was placed in reverse Trendelenburg.  A 
5-mm epigastric incision was created through which Nathansen retractor was passed into the abdominal 
cavity and used to retract the left lobe of the liver.  The area of the stomach was examined.  The an
gle of His was dissected bluntly.  Beginning about 5 cm from the gastroesophageal junction along the 
lesser curvature, the lesser omentum and gastrohepatic ligament were dissected to gain access into th
e lesser sac.  Once this entry was gained, a blue load of staples were fired transversely across the 
stomach at this level.

 

A gastrotomy was created inferior to the staple line.  This is actually relatively close to the stapl
e line that had been placed at the PEG tube site.  Care was taken to minimize the proximity of these 
two staple lines.  The anvil of a 25 mm EEA stapler was passed into the abdominal cavity and passed i
nto the upper pouch above the transverse staple line of the stomach.  Using the 5 mm band passer, the
 anvil was brought out through the anterior wall of the stomach just above the staple line.

 

The gastrotomy was then closed with a single fire of the blue load of the Cale stapler.

 

The Mariela limb of the small bowel was then identified.  An enterotomy was created in the devascularize
d segment.  The 25-mm EEA stapler was advanced through the left upper abdominal incision into the abd
ominal cavity and then advanced into the enterotomy.  It was passed a couple of centimeters proximall
y.  The spike of the stapler was advanced through the wall of the small bowel.  It was then affixed t
o the anvil in the gastric pouch.  The two segments were approximated and the stapler was fired, thus
 anastomosing these two segments.  The stapler was removed and the donuts were inspected and found to
 be intact.  The devascularized segment of the small bowel including the enterotomy was then resected
 with a final firing of the white load of the Cale stapler and the segment of small bowel was yola
cecilia from the abdominal cavity.

 

Not mentioned above, is that the gastric pouch was completed with 2 fires of the blue load of the sta
pler after the gastrotomy was closed.  These staple loads angled towards the angle of His.

 

The gastrojejunostomy was then buttressed with 3 interrupted sutures of 3-0 Vicryl.  A nasogastric tu
be was advanced through the gastrojejunostomy into the small bowel and with the small bowel clamped, 
the anastomosis was checked to ensure that it was airtight by insufflating the air while the anastomo
sis was under water.  There is no evidence of air leak.  The nasogastric tube was removed.  The Natha
nsen retractor was removed.  The 12-mm and 15-mm port site fascia was closed with 0 Vicryl suture usi
ng a GraNee needle.  All ports and instruments were removed from within the abdominal cavity.  All fl
uid had been aspirated prior to removing ports.  A pneumoperitoneum was carefully evacuated.  Quarter
 percent Marcaine with epinephrine was infiltrated at each port site.  Skin edges approximated with 4
-0 Monocryl subcuticular suture and Dermabond was placed externally.  There were no complications.  T
he patient tolerated the procedure well and was taken to recovery room in stable condition.
,

## 2023-11-21 ENCOUNTER — HOSPITAL ENCOUNTER (EMERGENCY)
Dept: HOSPITAL 92 - CSHERS | Age: 33
Discharge: HOME | End: 2023-11-21
Payer: MEDICAID

## 2023-11-21 DIAGNOSIS — Z3A.10: ICD-10-CM

## 2023-11-21 DIAGNOSIS — O99.511: Primary | ICD-10-CM

## 2023-11-21 DIAGNOSIS — J45.909: ICD-10-CM

## 2023-11-21 LAB
ALBUMIN SERPL BCG-MCNC: 4.4 G/DL (ref 3.5–5)
ALP SERPL-CCNC: 44 U/L (ref 40–110)
ALT SERPL W P-5'-P-CCNC: 34 U/L (ref 8–55)
ANION GAP SERPL CALC-SCNC: 15 MMOL/L (ref 10–20)
AST SERPL-CCNC: 27 U/L (ref 5–34)
BASOPHILS # BLD AUTO: 0 10X3/UL (ref 0–0.2)
BASOPHILS NFR BLD AUTO: 0.6 % (ref 0–2)
BILIRUB SERPL-MCNC: 0.3 MG/DL (ref 0.2–1.2)
BUN SERPL-MCNC: 11 MG/DL (ref 7–18.7)
CALCIUM SERPL-MCNC: 8.8 MG/DL (ref 7.8–10.44)
CHLORIDE SERPL-SCNC: 105 MMOL/L (ref 98–107)
CO2 SERPL-SCNC: 21 MMOL/L (ref 22–29)
CREAT CL PREDICTED SERPL C-G-VRATE: 0 ML/MIN (ref 70–130)
EOSINOPHIL # BLD AUTO: 0.1 10X3/UL (ref 0–0.5)
EOSINOPHIL NFR BLD AUTO: 2 % (ref 0–6)
GLOBULIN SER CALC-MCNC: 2.8 G/DL (ref 2.4–3.5)
GLUCOSE SERPL-MCNC: 87 MG/DL (ref 70–105)
HCT VFR BLD CALC: 34.3 % (ref 34.9–44.5)
HGB BLD-MCNC: 12.1 G/DL (ref 12–15.5)
LIPASE SERPL-CCNC: 29 U/L (ref 8–78)
LYMPHOCYTES NFR BLD AUTO: 35.2 % (ref 18–47)
MCH RBC QN AUTO: 31.8 PG (ref 27–33)
MCV RBC AUTO: 90 FL (ref 81.6–98.3)
MONOCYTES # BLD AUTO: 0.5 10X3/UL (ref 0–1.1)
MONOCYTES NFR BLD AUTO: 6.7 % (ref 0–10)
NEUTROPHILS # BLD AUTO: 3.9 10X3/UL (ref 1.5–8.4)
NEUTROPHILS NFR BLD AUTO: 55.4 % (ref 40–75)
PLATELET # BLD AUTO: 274 10X3/UL (ref 150–450)
POTASSIUM SERPL-SCNC: 3.8 MMOL/L (ref 3.5–5.1)
RBC # BLD AUTO: 3.81 10X6/UL (ref 3.9–5.03)
SODIUM SERPL-SCNC: 137 MMOL/L (ref 136–145)
SP GR UR STRIP: 1.01 (ref 1–1.03)
TROPONIN I SERPL DL<=0.01 NG/ML-MCNC: (no result) NG/ML (ref ?–0.03)
WBC # BLD AUTO: 7 10X3/UL (ref 3.5–10.5)
WBC UR QL AUTO: (no result) HPF (ref 0–3)

## 2023-11-21 PROCEDURE — 83690 ASSAY OF LIPASE: CPT

## 2023-11-21 PROCEDURE — 86901 BLOOD TYPING SEROLOGIC RH(D): CPT

## 2023-11-21 PROCEDURE — 81001 URINALYSIS AUTO W/SCOPE: CPT

## 2023-11-21 PROCEDURE — 80053 COMPREHEN METABOLIC PANEL: CPT

## 2023-11-21 PROCEDURE — 85379 FIBRIN DEGRADATION QUANT: CPT

## 2023-11-21 PROCEDURE — 71045 X-RAY EXAM CHEST 1 VIEW: CPT

## 2023-11-21 PROCEDURE — 76856 US EXAM PELVIC COMPLETE: CPT

## 2023-11-21 PROCEDURE — 87086 URINE CULTURE/COLONY COUNT: CPT

## 2023-11-21 PROCEDURE — 85025 COMPLETE CBC W/AUTO DIFF WBC: CPT

## 2023-11-21 PROCEDURE — 96374 THER/PROPH/DIAG INJ IV PUSH: CPT

## 2023-11-21 PROCEDURE — 84484 ASSAY OF TROPONIN QUANT: CPT

## 2023-11-21 PROCEDURE — 86850 RBC ANTIBODY SCREEN: CPT

## 2023-11-21 PROCEDURE — 86900 BLOOD TYPING SEROLOGIC ABO: CPT

## 2023-11-21 PROCEDURE — 93005 ELECTROCARDIOGRAM TRACING: CPT

## 2023-11-29 ENCOUNTER — HOSPITAL ENCOUNTER (EMERGENCY)
Dept: HOSPITAL 92 - CSHERS | Age: 33
Discharge: HOME | End: 2023-11-29
Payer: SELF-PAY

## 2023-11-29 DIAGNOSIS — Z3A.10: ICD-10-CM

## 2023-11-29 DIAGNOSIS — O36.4XX1: Primary | ICD-10-CM

## 2023-11-29 LAB
ALBUMIN SERPL BCG-MCNC: 4.4 G/DL (ref 3.5–5)
ALP SERPL-CCNC: 43 U/L (ref 40–110)
ALT SERPL W P-5'-P-CCNC: 28 U/L (ref 8–55)
ANION GAP SERPL CALC-SCNC: 14 MMOL/L (ref 10–20)
AST SERPL-CCNC: 25 U/L (ref 5–34)
BACTERIA UR QL AUTO: (no result) HPF
BASOPHILS # BLD AUTO: 0 10X3/UL (ref 0–0.2)
BASOPHILS NFR BLD AUTO: 0.6 % (ref 0–2)
BILIRUB SERPL-MCNC: 0.2 MG/DL (ref 0.2–1.2)
BUN SERPL-MCNC: 10 MG/DL (ref 7–18.7)
CALCIUM SERPL-MCNC: 9.1 MG/DL (ref 7.8–10.44)
CAUTI INDICATIONS FOR CULTURE: (no result)
CHLORIDE SERPL-SCNC: 106 MMOL/L (ref 98–107)
CO2 SERPL-SCNC: 23 MMOL/L (ref 22–29)
CREAT CL PREDICTED SERPL C-G-VRATE: 0 ML/MIN (ref 70–130)
EOSINOPHIL # BLD AUTO: 0.2 10X3/UL (ref 0–0.5)
EOSINOPHIL NFR BLD AUTO: 2.5 % (ref 0–6)
GLOBULIN SER CALC-MCNC: 3.2 G/DL (ref 2.4–3.5)
GLUCOSE SERPL-MCNC: 104 MG/DL (ref 70–105)
HCT VFR BLD CALC: 37 % (ref 34.9–44.5)
HGB BLD-MCNC: 12.8 G/DL (ref 12–15.5)
LYMPHOCYTES NFR BLD AUTO: 27.8 % (ref 18–47)
MCH RBC QN AUTO: 31.9 PG (ref 27–33)
MCV RBC AUTO: 92.3 FL (ref 81.6–98.3)
MONOCYTES # BLD AUTO: 0.4 10X3/UL (ref 0–1.1)
MONOCYTES NFR BLD AUTO: 5.8 % (ref 0–10)
NEUTROPHILS # BLD AUTO: 4.2 10X3/UL (ref 1.5–8.4)
NEUTROPHILS NFR BLD AUTO: 63 % (ref 40–75)
PLATELET # BLD AUTO: 303 10X3/UL (ref 150–450)
POTASSIUM SERPL-SCNC: 3.7 MMOL/L (ref 3.5–5.1)
RBC # BLD AUTO: 4.01 10X6/UL (ref 3.9–5.03)
RBC UR QL AUTO: (no result) HPF (ref 0–3)
SODIUM SERPL-SCNC: 139 MMOL/L (ref 136–145)
SP GR UR STRIP: 1.01 (ref 1–1.03)
WBC # BLD AUTO: 6.7 10X3/UL (ref 3.5–10.5)

## 2023-11-29 PROCEDURE — 86900 BLOOD TYPING SEROLOGIC ABO: CPT

## 2023-11-29 PROCEDURE — 76856 US EXAM PELVIC COMPLETE: CPT

## 2023-11-29 PROCEDURE — 85025 COMPLETE CBC W/AUTO DIFF WBC: CPT

## 2023-11-29 PROCEDURE — 80053 COMPREHEN METABOLIC PANEL: CPT

## 2023-11-29 PROCEDURE — 84702 CHORIONIC GONADOTROPIN TEST: CPT

## 2023-11-29 PROCEDURE — 86901 BLOOD TYPING SEROLOGIC RH(D): CPT

## 2023-11-29 PROCEDURE — 81001 URINALYSIS AUTO W/SCOPE: CPT
